# Patient Record
Sex: FEMALE | Race: WHITE | NOT HISPANIC OR LATINO
[De-identification: names, ages, dates, MRNs, and addresses within clinical notes are randomized per-mention and may not be internally consistent; named-entity substitution may affect disease eponyms.]

---

## 2021-12-15 PROBLEM — Z00.00 ENCOUNTER FOR PREVENTIVE HEALTH EXAMINATION: Status: ACTIVE | Noted: 2021-12-15

## 2022-04-28 ENCOUNTER — NON-APPOINTMENT (OUTPATIENT)
Age: 72
End: 2022-04-28

## 2022-07-29 ENCOUNTER — TRANSCRIPTION ENCOUNTER (OUTPATIENT)
Age: 72
End: 2022-07-29

## 2022-07-29 ENCOUNTER — NON-APPOINTMENT (OUTPATIENT)
Age: 72
End: 2022-07-29

## 2022-07-29 ENCOUNTER — APPOINTMENT (OUTPATIENT)
Dept: NEUROLOGY | Facility: CLINIC | Age: 72
End: 2022-07-29

## 2022-07-29 VITALS
BODY MASS INDEX: 26.21 KG/M2 | WEIGHT: 167 LBS | HEIGHT: 67 IN | DIASTOLIC BLOOD PRESSURE: 81 MMHG | TEMPERATURE: 98.7 F | OXYGEN SATURATION: 99 % | HEART RATE: 62 BPM | SYSTOLIC BLOOD PRESSURE: 123 MMHG

## 2022-07-29 DIAGNOSIS — E53.8 DEFICIENCY OF OTHER SPECIFIED B GROUP VITAMINS: ICD-10-CM

## 2022-07-29 DIAGNOSIS — Z87.891 PERSONAL HISTORY OF NICOTINE DEPENDENCE: ICD-10-CM

## 2022-07-29 DIAGNOSIS — Z78.9 OTHER SPECIFIED HEALTH STATUS: ICD-10-CM

## 2022-07-29 DIAGNOSIS — I10 ESSENTIAL (PRIMARY) HYPERTENSION: ICD-10-CM

## 2022-07-29 DIAGNOSIS — Z72.89 OTHER PROBLEMS RELATED TO LIFESTYLE: ICD-10-CM

## 2022-07-29 DIAGNOSIS — E78.5 HYPERLIPIDEMIA, UNSPECIFIED: ICD-10-CM

## 2022-07-29 DIAGNOSIS — Z87.39 PERSONAL HISTORY OF OTHER DISEASES OF THE MUSCULOSKELETAL SYSTEM AND CONNECTIVE TISSUE: ICD-10-CM

## 2022-07-29 DIAGNOSIS — E03.9 HYPOTHYROIDISM, UNSPECIFIED: ICD-10-CM

## 2022-07-29 PROCEDURE — 99205 OFFICE O/P NEW HI 60 MIN: CPT

## 2022-07-29 RX ORDER — LEVOTHYROXINE SODIUM 112 UG/1
112 TABLET ORAL
Refills: 0 | Status: ACTIVE | COMMUNITY

## 2022-07-29 RX ORDER — ASCORBIC ACID 125 MG
TABLET,CHEWABLE ORAL
Refills: 0 | Status: ACTIVE | COMMUNITY

## 2022-07-29 RX ORDER — CHROMIUM 200 MCG
TABLET ORAL
Refills: 0 | Status: ACTIVE | COMMUNITY

## 2022-07-29 RX ORDER — CLONAZEPAM 1 MG/1
1 TABLET ORAL
Refills: 0 | Status: ACTIVE | COMMUNITY

## 2022-07-29 RX ORDER — ATORVASTATIN CALCIUM 10 MG/1
10 TABLET, FILM COATED ORAL
Refills: 0 | Status: ACTIVE | COMMUNITY

## 2022-07-29 RX ORDER — SERTRALINE HYDROCHLORIDE 100 MG/1
100 TABLET, FILM COATED ORAL
Refills: 0 | Status: ACTIVE | COMMUNITY

## 2022-07-29 NOTE — PHYSICAL EXAM
[FreeTextEntry1] : Physical examination \par General: No acute distress, Awake, Alert.   \par  other: oral buccal dyskinesia\par \par Mental status \par Awake, alert, and oriented to person, time and place, Normal attention span and concentration, Recent and remote memory intact, Language intact, Fund of knowledge intact.   \par Cranial Nerves \par II: VFF  \par III, IV, VI: PERRL, EOMI.   \par V: Facial sensation is normal B/L.   \par VII: Facial strength is normal B/L. \par VIII: Gross hearing is intact.   \par  IX, X: Palate is midline and elevates symmetrically.   \par XI: Trapezius normal strength.   \par XII: Tongue midline without atrophy or fasciculations. \par \par Motor exam  \par Muscle tone - cogwheel left>right arm with reinforcement\par increased tone left leg.\par \par No atrophy or fasciculations \par Muscle Strength: arms and legs, proximal and distal flexors and extensors are normal \par \par resting pill rolling hand tremor left>right\par No UE drift.\par \par Reflexes \par Diffuse hyperreflexia.  \par \par Plantars right: mute.   \par Plantars left: mute.   \par   \par \par Coordination \par  Slow, no ataxia - FNF, RIZWANA, HKS. \par \par Sensory \par Intact sensation to vibration and cold. \par \par \par Gait \par reduced arm swing b\par Slow, wide based gait. \par positive Romberg

## 2022-07-29 NOTE — ASSESSMENT
[FreeTextEntry1] : Rebekah Bhatt is a 72 year old woman with tremor.\par \par Possible essential tremor or drug induced tremor as side effect of Abilify.\par MRI Brain to rule out structural lesions.\par Continue Propranolol\par Second opinion with Dr. Herrera.\par Walk daily.\par PT referral.

## 2022-07-29 NOTE — CONSULT LETTER
[Dear  ___] : Dear  [unfilled], [Consult Letter:] : I had the pleasure of evaluating your patient, [unfilled]. [Please see my note below.] : Please see my note below. [FreeTextEntry3] : Sincerely,\par \par Kathy Haas M.D.\par

## 2022-07-29 NOTE — HISTORY OF PRESENT ILLNESS
[FreeTextEntry1] : Rebekah Bhatt is a left handed woman with tremors in both hands two years ago left>right hand. \par does not interfere with daily activities. \par does  not note it is worsening with stress or caffeine. \par Does not recall any family members had a tremor. \par No change in handwriting. \par No difficulty with buttons or tremors. \par No change in voice. \par No tremor elsewhere.\par Had fall due to tripping over dogs toy.\par Had right ankle surgery. \par No alcohol use. Quit smoking 11 years ago 2009.\par \par Sees psychiatrist Dr. Mcelroy. Propranolol started a month ago. Has never taken Lithium\par Sees therapist, psychiatrist and group therapy \par \par At one point last year stopped all of her psych medications with resolution of tremor but had severe withdrawal- \par \par \par Current medications:\par Synthroid 112mcg\par Klonopin 1mg once daily \par trazodone 150mg bedtime\par zoloft 200mg in AM\par Arpiprazole 5mg daily\par Atorvastatin 10mg\par Propranolol 10mg BID\par Wellbutrin XL 300mg in AM \par Vitamin D3 \par b12 1000\par folic acid 400mg\par \par Previous history: .\par Had \par Medical history:

## 2022-11-28 ENCOUNTER — APPOINTMENT (OUTPATIENT)
Dept: NEUROLOGY | Facility: CLINIC | Age: 72
End: 2022-11-28

## 2022-11-28 VITALS
OXYGEN SATURATION: 97 % | SYSTOLIC BLOOD PRESSURE: 150 MMHG | BODY MASS INDEX: 26.53 KG/M2 | HEIGHT: 67 IN | HEART RATE: 56 BPM | WEIGHT: 169 LBS | DIASTOLIC BLOOD PRESSURE: 77 MMHG

## 2022-11-28 PROCEDURE — 99213 OFFICE O/P EST LOW 20 MIN: CPT

## 2022-11-28 RX ORDER — ARIPIPRAZOLE 5 MG/1
5 TABLET ORAL
Qty: 30 | Refills: 0 | Status: ACTIVE | COMMUNITY
Start: 2022-11-08

## 2022-11-28 RX ORDER — TRAZODONE HYDROCHLORIDE 100 MG/1
100 TABLET ORAL
Refills: 0 | Status: DISCONTINUED | COMMUNITY
End: 2022-11-28

## 2022-11-28 RX ORDER — TRAZODONE HYDROCHLORIDE 150 MG/1
150 TABLET ORAL
Qty: 30 | Refills: 0 | Status: ACTIVE | COMMUNITY
Start: 2022-11-08

## 2022-11-28 RX ORDER — BUPROPION HYDROCHLORIDE 75 MG/1
TABLET, FILM COATED ORAL
Refills: 0 | Status: DISCONTINUED | COMMUNITY
End: 2022-11-28

## 2022-11-28 RX ORDER — AMLODIPINE BESYLATE 5 MG/1
5 TABLET ORAL
Refills: 0 | Status: DISCONTINUED | COMMUNITY
End: 2022-11-28

## 2022-11-28 RX ORDER — PROPRANOLOL HYDROCHLORIDE 60 MG/1
60 CAPSULE, EXTENDED RELEASE ORAL
Qty: 30 | Refills: 0 | Status: ACTIVE | COMMUNITY
Start: 2022-11-08

## 2022-11-28 RX ORDER — BUPROPION HYDROCHLORIDE 300 MG/1
300 TABLET, EXTENDED RELEASE ORAL
Qty: 30 | Refills: 0 | Status: ACTIVE | COMMUNITY
Start: 2022-09-07

## 2022-11-28 RX ORDER — PROPRANOLOL HYDROCHLORIDE 10 MG/1
10 TABLET ORAL
Refills: 0 | Status: DISCONTINUED | COMMUNITY

## 2022-11-28 RX ORDER — ARIPIPRAZOLE 10 MG/1
10 TABLET ORAL
Refills: 0 | Status: DISCONTINUED | COMMUNITY
End: 2022-11-28

## 2022-11-28 RX ORDER — PROPRANOLOL HYDROCHLORIDE 20 MG/1
20 TABLET ORAL
Qty: 60 | Refills: 0 | Status: DISCONTINUED | COMMUNITY
Start: 2022-09-07

## 2022-11-28 NOTE — ASSESSMENT
[FreeTextEntry1] : Rebekah Bhatt is a 72 year old woman with tremor.\par \par BP diary before visit with Dr. Ambrocio\par \par Possible essential tremor or drug induced tremor as side effect of Abilify- drug was reduced from 10 to 5 mg.\par She will consider further work up and PT after Dr. Herrera consultation.\par \par f/u with me PRN.\par \par

## 2022-11-28 NOTE — PHYSICAL EXAM
[FreeTextEntry1] : Physical examination \par General: No acute distress, Awake, Alert.   \par  other: oral buccal dyskinesia\par \par Mental status \par Awake, alert, and oriented to person, time and place, Normal attention span and concentration, Recent and remote memory intact, Language intact, Fund of knowledge intact.   \par Cranial Nerves \par II: VFF  \par III, IV, VI: PERRL, EOMI.   \par V: Facial sensation is normal B/L.   \par VII: Facial strength is normal B/L. \par VIII: Gross hearing is intact.   \par  IX, X: Palate is midline and elevates symmetrically.   \par XI: Trapezius normal strength.   \par XII: Tongue midline without atrophy or fasciculations. \par \par Motor exam  \par Muscle tone - cogwheel left>right arm with reinforcement\par increased tone left leg.\par \par No atrophy or fasciculations \par Muscle Strength: arms and legs, proximal and distal flexors and extensors are normal \par No UE drift.\par \par Reflexes \par Diffuse hyperreflexia.  \par Plantars right: mute.   \par Plantars left: mute.   \par   \par \par Coordination \par Slow, no ataxia - FNF, RIZWANA, HKS. \par \par \par \par Gait \par reduced arm swing b\par Slow, wide based gait. \par positive Romberg\par \par resting pill rolling hand tremor left>right- not noted today.

## 2023-01-04 ENCOUNTER — APPOINTMENT (OUTPATIENT)
Dept: NEUROLOGY | Facility: CLINIC | Age: 73
End: 2023-01-04
Payer: MEDICARE

## 2023-01-04 VITALS
HEART RATE: 65 BPM | OXYGEN SATURATION: 97 % | WEIGHT: 171 LBS | DIASTOLIC BLOOD PRESSURE: 79 MMHG | BODY MASS INDEX: 26.84 KG/M2 | HEIGHT: 67 IN | SYSTOLIC BLOOD PRESSURE: 141 MMHG

## 2023-01-04 DIAGNOSIS — F41.9 ANXIETY DISORDER, UNSPECIFIED: ICD-10-CM

## 2023-01-04 DIAGNOSIS — F32.A ANXIETY DISORDER, UNSPECIFIED: ICD-10-CM

## 2023-01-04 PROCEDURE — 99214 OFFICE O/P EST MOD 30 MIN: CPT

## 2023-01-04 NOTE — PHYSICAL EXAM
[Person] : oriented to person [Place] : oriented to place [Time] : oriented to time [Concentration Intact] : normal concentrating ability [Comprehension] : comprehension intact [Cranial Nerves Optic (II)] : visual acuity intact bilaterally,  visual fields full to confrontation, pupils equal round and reactive to light [Cranial Nerves Oculomotor (III)] : extraocular motion intact [Cranial Nerves Trigeminal (V)] : facial sensation intact symmetrically [Cranial Nerves Facial (VII)] : face symmetrical [Cranial Nerves Vestibulocochlear (VIII)] : hearing was intact bilaterally [Cranial Nerves Glossopharyngeal (IX)] : tongue and palate midline [Cranial Nerves Accessory (XI - Cranial And Spinal)] : head turning and shoulder shrug symmetric [Cranial Nerves Hypoglossal (XII)] : there was no tongue deviation with protrusion [Motor Strength] : muscle strength was normal in all four extremities [Paresis Pronator Drift Right-Sided] : no pronator drift on the right [Paresis Pronator Drift Left-Sided] : no pronator drift on the left [Sensation Tactile Decrease] : light touch was intact [Coordination - Dysmetria Impaired Finger-to-Nose Bilateral] : not present [1+] : Patella left 1+ [FreeTextEntry1] : Face is masked with decreased blinking rate. Voice is normal. Saccades are slowed with impairment in upgaze.\par There were mild-moderate resting tremors of the hands, right more than left. Slight tremors of the legs, right more than left.\par No significant bradykinesia. \par Mild rigidity with activation, right more than left.\par Normal leg agility.\par Normal toe tapping.\par No trouble standing with arms crossed.\par Gait is with good stride length and speed, good arm swing, pivot turning. No shuffling or freezing of gait.\par Postural reflexes are intact.

## 2023-01-04 NOTE — DISCUSSION/SUMMARY
[FreeTextEntry1] : Rebekah Bhatt is a 72 year old F with a PMHx of Anxiety and Depression, HLD, HTN, and Hypothyroidism. She presents for initial evaluation in the Movement Disorders Clinic at [facility]. She was referred by Dr. Haas.\par \par The patient's history and physical examination are concerning for Parkinsonism. No significant non-motor features. Differential includes drug induced parkinsonism vs. idiopathic parkinson's disease. Further investigations are recommended.\par \par Will obtain James scan to assess for dopamine deficienct.\par \par RTC next available follow up\par \par All questions were answered to her satisfaction. I spent 45 minutes on this encounter.

## 2023-01-04 NOTE — HISTORY OF PRESENT ILLNESS
[FreeTextEntry1] : Rebekah Bhatt is a 72 year old F with a PMHx of Anxiety and Depression, HLD, HTN, and Hypothyroidism. She presents for initial evaluation in the Movement Disorders Clinic at [facility]. She was referred by Dr. Haas.\par \par She developed a tremor of the right hand within the last year. Also a little on the left side. She notices the tremors when she is at rest and sometimes her  notices the tremors when she is eating. Not bothersome, but she has no limitation in her ADLs.\par Her PCP told her to stop her BP meds because her BP was low. \par \par No trouble turning or adjusting in bed at night. \par No trouble with buttons, zippers, shoelaces or cutting food. \par She had surgery last year in the right foot. Sometimes she has walking with that foot. \par \par Good sense of smell.\par Handwriting is not smaller.\par No trouble swallowing. \par No changes in voice. \par She is more hunched over.\par No changes in facial expression.\par No constipation. \par No trouble with urination. \par No trouble sleeping. Takes Trazodone. No history of talking in sleep or acting out dreams.\par No dizziness/lightheadedness when standing.\par Memory is pretty good.\par Anxiety and depression are well controlled. Follows with psychiatry and psychology. \par \par Current meds:\par Abilify 5mg daily - 1-2 years\par Trazodone\par Wellbutrin\par Clonazepam\par Propranolol 80mg\par Sertraline\par \par Family history: unremarkable\par Social history: retired, she does exercise by walking 15 minutes twice day

## 2023-05-31 ENCOUNTER — APPOINTMENT (OUTPATIENT)
Dept: NEUROLOGY | Facility: CLINIC | Age: 73
End: 2023-05-31
Payer: MEDICARE

## 2023-05-31 VITALS
BODY MASS INDEX: 25.51 KG/M2 | SYSTOLIC BLOOD PRESSURE: 155 MMHG | WEIGHT: 162.5 LBS | HEART RATE: 60 BPM | HEIGHT: 67 IN | DIASTOLIC BLOOD PRESSURE: 70 MMHG | OXYGEN SATURATION: 99 %

## 2023-05-31 DIAGNOSIS — R25.1 TREMOR, UNSPECIFIED: ICD-10-CM

## 2023-05-31 DIAGNOSIS — G20 PARKINSON'S DISEASE: ICD-10-CM

## 2023-05-31 PROCEDURE — 99214 OFFICE O/P EST MOD 30 MIN: CPT

## 2023-06-02 PROBLEM — R25.1 TREMOR: Status: ACTIVE | Noted: 2022-07-29

## 2023-06-02 PROBLEM — G20 PARKINSONISM: Status: ACTIVE | Noted: 2023-01-04

## 2023-06-02 NOTE — DISCUSSION/SUMMARY
[FreeTextEntry1] : Rebekah Bhatt is a 72 year old F with a PMHx of Anxiety and Depression, HLD, HTN, and Hypothyroidism. She presents for follow up in the Movement Disorders Clinic at [facility]. She was referred by Dr. Haas. She is accompanied by her  who provides collateral history. \par \par The patient's history and physical examination are concerning for Parkinsonism. No significant non-motor features. Differential includes drug induced parkinsonism vs. idiopathic parkinson's disease. Further investigations are recommended.\par \par Will obtain FDOPA scan to assess for dopamine deficiency.\par \par RTC 6 months\par \par All questions were answered to her satisfaction. I spent 30 minutes on this encounter.

## 2023-06-02 NOTE — HISTORY OF PRESENT ILLNESS
[FreeTextEntry1] : Rebekah Bhatt is a 72 year old F with a PMHx of Anxiety and Depression, HLD, HTN, and Hypothyroidism. She presents for follow up in the Movement Disorders Clinic at [facility]. She was referred by Dr. Haas. She is accompanied by her  who provides collateral history. \par \par Interval history:\par Since the last visit, she was hospitalized for 3.5 months after slipping on a wet tile. She fractured her left knee cap requiring surgery and injured her left shoulder. She is recovering well and has therapists coming to the house twice a week. Because of the accident she was unable to get the James scan. \par \par No trouble sleeping. No talking in sleep or acting out dreams. Trazodone, Clonazepam. \par She is not shuffling but she is careful on the left foot.\par No constipation. She has a daily bowel movement. \par Tremor is the same. She is on propranolol and it was increased to 80mg and she feels her tremors are better. \par She denies dizziness/lightheadedness when standing. \par Her balance is off despite therapy working on balance.\par \par Current meds:\par Abilify 5mg daily - 1-2 years\par Trazodone\par Clonazepam\par Propranolol 80mg\par Sertraline\par \par Prior meds:\par Wellbutrin\par \par Initial history:\par She developed a tremor of the right hand within the last year. Also a little on the left side. She notices the tremors when she is at rest and sometimes her  notices the tremors when she is eating. Not bothersome, but she has no limitation in her ADLs.\par Her PCP told her to stop her BP meds because her BP was low. \par \par No trouble turning or adjusting in bed at night. \par No trouble with buttons, zippers, shoelaces or cutting food. \par She had surgery last year in the right foot. Sometimes she has walking with that foot. \par \par Good sense of smell.\par Handwriting is not smaller.\par No trouble swallowing. \par No changes in voice. \par She is more hunched over.\par No changes in facial expression.\par No constipation. \par No trouble with urination. \par No trouble sleeping. Takes Trazodone. No history of talking in sleep or acting out dreams.\par No dizziness/lightheadedness when standing.\par Memory is pretty good.\par Anxiety and depression are well controlled. Follows with psychiatry and psychology. \par \par Current meds:\par Abilify 5mg daily - 1-2 years\par Trazodone\par Wellbutrin\par Clonazepam\par Propranolol 80mg\par Sertraline\par \par Family history: unremarkable\par Social history: retired, she does exercise by walking 15 minutes twice day

## 2023-06-02 NOTE — PHYSICAL EXAM
[Person] : oriented to person [Place] : oriented to place [Time] : oriented to time [Concentration Intact] : normal concentrating ability [Comprehension] : comprehension intact [Cranial Nerves Optic (II)] : visual acuity intact bilaterally,  visual fields full to confrontation, pupils equal round and reactive to light [Cranial Nerves Oculomotor (III)] : extraocular motion intact [Cranial Nerves Trigeminal (V)] : facial sensation intact symmetrically [Cranial Nerves Facial (VII)] : face symmetrical [Cranial Nerves Vestibulocochlear (VIII)] : hearing was intact bilaterally [Cranial Nerves Glossopharyngeal (IX)] : tongue and palate midline [Cranial Nerves Accessory (XI - Cranial And Spinal)] : head turning and shoulder shrug symmetric [Cranial Nerves Hypoglossal (XII)] : there was no tongue deviation with protrusion [Motor Strength] : muscle strength was normal in all four extremities [Sensation Tactile Decrease] : light touch was intact [1+] : Patella left 1+ [Paresis Pronator Drift Right-Sided] : no pronator drift on the right [Paresis Pronator Drift Left-Sided] : no pronator drift on the left [Coordination - Dysmetria Impaired Finger-to-Nose Bilateral] : not present [FreeTextEntry1] : Face is masked with decreased blinking rate. Voice is normal. Saccades are slowed with impairment in upgaze.\par There were mild-moderate resting tremors of the hands, right more than left. No leg tremors noted today.\par Slight bradykinesia on the right. \par Mild rigidity with activation, right more than left.\par Normal leg agility.\par Normal toe tapping.\par No trouble standing with arms crossed.\par Gait is with good stride length and speed, good arm swing, pivot turning. No shuffling or freezing of gait.\par Postural reflexes are intact.

## 2023-11-06 ENCOUNTER — RESULT REVIEW (OUTPATIENT)
Age: 73
End: 2023-11-06

## 2023-11-06 DIAGNOSIS — F05 DELIRIUM DUE TO KNOWN PHYSIOLOGICAL CONDITION: ICD-10-CM

## 2023-11-06 DIAGNOSIS — F03.90 UNSPECIFIED DEMENTIA W/OUT BEHAVIORAL DISTURBANCE: ICD-10-CM

## 2023-11-15 ENCOUNTER — APPOINTMENT (OUTPATIENT)
Dept: NEUROLOGY | Facility: CLINIC | Age: 73
End: 2023-11-15
Payer: MEDICARE

## 2023-11-15 VITALS
DIASTOLIC BLOOD PRESSURE: 93 MMHG | WEIGHT: 168 LBS | HEART RATE: 65 BPM | SYSTOLIC BLOOD PRESSURE: 153 MMHG | HEIGHT: 67 IN | OXYGEN SATURATION: 96 % | BODY MASS INDEX: 26.37 KG/M2

## 2023-11-15 DIAGNOSIS — R26.89 OTHER ABNORMALITIES OF GAIT AND MOBILITY: ICD-10-CM

## 2023-11-15 DIAGNOSIS — G21.19 OTHER DRUG INDUCED SECONDARY PARKINSONISM: ICD-10-CM

## 2023-11-15 PROCEDURE — 99214 OFFICE O/P EST MOD 30 MIN: CPT

## 2024-12-25 PROBLEM — F10.90 ALCOHOL USE: Status: ACTIVE | Noted: 2022-07-29

## 2025-09-10 ENCOUNTER — TRANSCRIPTION ENCOUNTER (OUTPATIENT)
Age: 75
End: 2025-09-10